# Patient Record
Sex: FEMALE | URBAN - METROPOLITAN AREA
[De-identification: names, ages, dates, MRNs, and addresses within clinical notes are randomized per-mention and may not be internally consistent; named-entity substitution may affect disease eponyms.]

---

## 2018-06-27 ENCOUNTER — NEW PATIENT (OUTPATIENT)
Dept: URBAN - METROPOLITAN AREA CLINIC 10 | Facility: CLINIC | Age: 63
End: 2018-06-27

## 2018-06-27 DIAGNOSIS — H26.493 OTHER SECONDARY CATARACT, BILATERAL: Primary | ICD-10-CM

## 2018-06-27 DIAGNOSIS — H40.013 OPEN ANGLE WITH BORDERLINE FINDINGS, LOW RISK, BILATERAL: ICD-10-CM

## 2018-06-27 PROCEDURE — 92004 COMPRE OPH EXAM NEW PT 1/>: CPT | Performed by: OPTOMETRIST

## 2018-06-27 ASSESSMENT — INTRAOCULAR PRESSURE
OS: 12
OD: 12

## 2018-06-27 ASSESSMENT — VISUAL ACUITY
OD: 20/20
OS: 20/20

## 2018-06-27 ASSESSMENT — KERATOMETRY
OD: 43.00
OS: 43.00

## 2018-08-27 ENCOUNTER — FOLLOW UP ESTABLISHED (OUTPATIENT)
Dept: URBAN - METROPOLITAN AREA CLINIC 10 | Facility: CLINIC | Age: 63
End: 2018-08-27

## 2018-08-27 DIAGNOSIS — H16.223 KERATOCONJUNCT SICCA, NOT SPECIFIED AS SJOGREN'S, BILATERAL: ICD-10-CM

## 2018-08-27 PROCEDURE — 92133 CPTRZD OPH DX IMG PST SGM ON: CPT | Performed by: OPTOMETRIST

## 2018-08-27 PROCEDURE — 92012 INTRM OPH EXAM EST PATIENT: CPT | Performed by: OPTOMETRIST

## 2018-08-27 PROCEDURE — 92083 EXTENDED VISUAL FIELD XM: CPT | Performed by: OPTOMETRIST

## 2018-08-27 ASSESSMENT — INTRAOCULAR PRESSURE
OS: 13
OD: 13

## 2023-06-15 ENCOUNTER — OFFICE VISIT (OUTPATIENT)
Dept: URBAN - METROPOLITAN AREA CLINIC 10 | Facility: CLINIC | Age: 68
End: 2023-06-15
Payer: MEDICARE

## 2023-06-15 DIAGNOSIS — H26.493 OTHER SECONDARY CATARACT, BILATERAL: Primary | ICD-10-CM

## 2023-06-15 DIAGNOSIS — H16.223 KERATOCONJUNCTIVITIS SICCA, NOT SPECIFIED AS SJOGREN'S, BILATERAL: ICD-10-CM

## 2023-06-15 DIAGNOSIS — H40.013 OPEN ANGLE WITH BORDERLINE FINDINGS, LOW RISK, BILATERAL: ICD-10-CM

## 2023-06-15 PROCEDURE — 92082 INTERMEDIATE VISUAL FIELD XM: CPT | Performed by: OPTOMETRIST

## 2023-06-15 PROCEDURE — 99204 OFFICE O/P NEW MOD 45 MIN: CPT | Performed by: OPTOMETRIST

## 2023-06-15 PROCEDURE — 92133 CPTRZD OPH DX IMG PST SGM ON: CPT | Performed by: OPTOMETRIST

## 2023-06-15 ASSESSMENT — INTRAOCULAR PRESSURE
OS: 13
OD: 12

## 2023-06-15 ASSESSMENT — VISUAL ACUITY
OS: 20/20
OD: 20/20

## 2023-06-15 NOTE — IMPRESSION/PLAN
Impression: Keratoconjunctivitis sicca, bilateral Plan: Improved. KCS/MGD/blepharitis. Discussed lid hygiene, warm compresses, lipid based tears, and Omega 3 fish oils. Begin Pataday qday for allergies.

## 2023-06-15 NOTE — IMPRESSION/PLAN
Impression: Other secondary cataract, bilateral Plan: Opacified capsule not affecting vision. No indication for treatment. Return if decreased vision. s/p Restor OU. Pt. happy c vision.

## 2023-06-15 NOTE — IMPRESSION/PLAN
Impression: Open angle with borderline findings, low risk, bilateral
--fhx (mother)
--nerves suspicious Plan: IOP is normal OU. NFL OCT remains normal and stable OU. NFL OCT is normal OU. HVF: limited reliability OU however full OU. FDT: full OD, nasal defects OS. Low risk at this time. RTC 1 year and order NFL OCT.